# Patient Record
(demographics unavailable — no encounter records)

---

## 2024-10-16 NOTE — HISTORY OF PRESENT ILLNESS
[FreeTextEntry1] : Anxious being in medical office related to his mothers hospitalization and death. elevated BP, anxious, no headache, chest pain home BP in good range enjoyed hiking up [place] this past summer  After 4/26/24 Lipis: TG = 600; CHol 239/53/87 he was seen by Dr Aidan Pantoja on 6/23/24 and put on  Iscopent Ethyl along with fenofibrate 145 and pravastatin  40 mg. He changed his diet - eliminating simple carbs.  He notes improved energy and well being. He lost 4# since 4/18/24  currently weighs 161#  BMI = 23.76  He takes Genvoya regularly - initiated 6/1/17- on 4/18/24  HIV viral load UNDETECTABLE  <12; CD4= 1726 - 66% Creat = 1.01, TG = 600; CHol 239/53/8; LipoProtein A = 19.4  He declines colonoscopy  - was offered cologuard 1 year ago but did not follow through  	 				 [Sexually Active] : The patient is sexually active [Monogamous] : monogamous

## 2024-10-16 NOTE — PHYSICAL EXAM
[General Appearance - Alert] : alert [General Appearance - In No Acute Distress] : in no acute distress [FreeTextEntry1] : flushed face, tremuolus [Sclera] : the sclera and conjunctiva were normal [PERRL With Normal Accommodation] : pupils were equal in size, round, reactive to light [Extraocular Movements] : extraocular movements were intact [Outer Ear] : the ears and nose were normal in appearance [Oropharynx] : the oropharynx was normal with no thrush [Neck Cervical Mass (___cm)] : no neck mass was observed [Neck Appearance] : the appearance of the neck was normal [Jugular Venous Distention Increased] : there was no jugular-venous distention [Thyroid Diffuse Enlargement] : the thyroid was not enlarged [Auscultation Breath Sounds / Voice Sounds] : lungs were clear to auscultation bilaterally [Heart Rate And Rhythm] : heart rate was normal and rhythm regular [Heart Sounds] : normal S1 and S2 [Heart Sounds Gallop] : no gallops [Murmurs] : no murmurs [Heart Sounds Pericardial Friction Rub] : no pericardial rub [Edema] : there was no peripheral edema [Bowel Sounds] : normal bowel sounds [Abdomen Soft] : soft [Abdomen Tenderness] : non-tender [Abdomen Mass (___ Cm)] : no abdominal mass palpated [Costovertebral Angle Tenderness] : no CVA tenderness [No Palpable Adenopathy] : no palpable adenopathy [Musculoskeletal - Swelling] : no joint swelling [Nail Clubbing] : no clubbing  or cyanosis of the fingernails [Motor Tone] : muscle strength and tone were normal [Skin Color & Pigmentation] : normal skin color and pigmentation [] : no rash [Oriented To Time, Place, And Person] : oriented to person, place, and time [Affect] : the affect was normal

## 2024-10-16 NOTE — ASSESSMENT
[FreeTextEntry1] : HIB well controlled good BP at  home elevated TG/Increased Chol marked improved diet situational anxiety/PTSD  labs lipid profile (non fasting ) on Iscopent Ethyl , fenfibrate, Pravastatin  [Nutritional / Food Issues] : nutritional/food issues [Medical Care Issues] : medical care issues

## 2025-01-02 NOTE — HISTORY OF PRESENT ILLNESS
[FreeTextEntry1] : Dear Jerry,   I had the pleasure of seeing your patient HAYLIE ALCANTARA for Cardiometabolic evaluation.   As you know, he  is a Pleasant, 57 year old with a past medical history of HIV, Former Smoker, Hyperlipidemia/Hypertrilgyceridemia (FHx Father - MI 60s/CABG), HTN =============== =============== HIV, Former Smoker, Hyperlipidemia/Hypertrilgyceridemia (FHx Father - MI 60s/CABG - Start Prava 40 (Other statins contraindicated) - Start Vascepa - Continue Fenofibrate - See dietician   HTN - Documented White Coat HTN In the 120s/80s at home  - Losartan 100     ----------------------------

## 2025-01-02 NOTE — DISCUSSION/SUMMARY
[FreeTextEntry1] : In summary   Yung, 57 year old with a past medical history of HIV, Former Smoker, Hyperlipidemia/Hypertrilgyceridemia (FHx Father - MI 60s/CABG), HTN =============== =============== HIV, Former Smoker, Hyperlipidemia/Hypertrilgyceridemia (FHx Father - MI 60s/CABG - Start Prava 40 (Other statins contraindicated) - Start Vascepa - Continue Fenofibrate - See dietician   HTN - Documented White Coat HTN In the 120s/80s at home  - Losartan 100     Mn Systolic Murmur I.VI -TTE   Jerry, kind thanks for the referral.   Kevin Pantoja MD [facility] ANGIE WAN Director, Preventive Cardiology & Lipidology [facility] [place]